# Patient Record
Sex: MALE | URBAN - METROPOLITAN AREA
[De-identification: names, ages, dates, MRNs, and addresses within clinical notes are randomized per-mention and may not be internally consistent; named-entity substitution may affect disease eponyms.]

---

## 2022-08-19 ENCOUNTER — NEW REFERRAL (OUTPATIENT)
Dept: URBAN - METROPOLITAN AREA CLINIC 87 | Facility: CLINIC | Age: 57
End: 2022-08-19

## 2022-08-19 VITALS — HEART RATE: 79 BPM | DIASTOLIC BLOOD PRESSURE: 78 MMHG | SYSTOLIC BLOOD PRESSURE: 116 MMHG

## 2022-08-19 DIAGNOSIS — H43.392: ICD-10-CM

## 2022-08-19 DIAGNOSIS — H43.811: ICD-10-CM

## 2022-08-19 PROCEDURE — 99204 OFFICE O/P NEW MOD 45 MIN: CPT

## 2022-08-19 PROCEDURE — 92134 CPTRZ OPH DX IMG PST SGM RTA: CPT

## 2022-08-19 ASSESSMENT — VISUAL ACUITY
OD_SC: 20/50
OD_SC: 20/20
OS_SC: 20/20
OS_SC: 20/50+1

## 2022-08-19 ASSESSMENT — TONOMETRY
OS_IOP_MMHG: 12
OD_IOP_MMHG: 10

## 2022-08-29 ENCOUNTER — FOLLOW UP (OUTPATIENT)
Dept: URBAN - METROPOLITAN AREA CLINIC 87 | Facility: CLINIC | Age: 57
End: 2022-08-29

## 2022-08-29 DIAGNOSIS — H43.392: ICD-10-CM

## 2022-08-29 DIAGNOSIS — H43.811: ICD-10-CM

## 2022-08-29 PROCEDURE — 92014 COMPRE OPH EXAM EST PT 1/>: CPT

## 2022-08-29 ASSESSMENT — VISUAL ACUITY
OS_SC: 20/25
OD_SC: 20/20-2

## 2022-08-29 ASSESSMENT — TONOMETRY
OS_IOP_MMHG: 11
OD_IOP_MMHG: 10

## 2022-10-03 ENCOUNTER — FOLLOW UP (OUTPATIENT)
Dept: URBAN - METROPOLITAN AREA CLINIC 87 | Facility: CLINIC | Age: 57
End: 2022-10-03

## 2022-10-03 DIAGNOSIS — H43.813: ICD-10-CM

## 2022-10-03 DIAGNOSIS — H43.392: ICD-10-CM

## 2022-10-03 PROCEDURE — 92014 COMPRE OPH EXAM EST PT 1/>: CPT

## 2022-10-03 ASSESSMENT — TONOMETRY
OS_IOP_MMHG: 16
OD_IOP_MMHG: 15

## 2022-10-03 ASSESSMENT — VISUAL ACUITY
OD_SC: 20/25-2
OS_SC: 20/20

## 2022-11-21 ENCOUNTER — FOLLOW UP (OUTPATIENT)
Dept: URBAN - METROPOLITAN AREA CLINIC 87 | Facility: CLINIC | Age: 57
End: 2022-11-21

## 2022-11-21 DIAGNOSIS — H43.813: ICD-10-CM

## 2022-11-21 PROCEDURE — 92014 COMPRE OPH EXAM EST PT 1/>: CPT

## 2022-11-21 ASSESSMENT — TONOMETRY
OS_IOP_MMHG: 14
OD_IOP_MMHG: 13

## 2022-11-21 ASSESSMENT — VISUAL ACUITY
OD_SC: 20/30-1
OS_SC: 20/25-1

## 2023-04-28 ENCOUNTER — FOLLOW UP (OUTPATIENT)
Dept: URBAN - METROPOLITAN AREA CLINIC 87 | Facility: CLINIC | Age: 58
End: 2023-04-28

## 2023-04-28 DIAGNOSIS — Z96.1: ICD-10-CM

## 2023-04-28 DIAGNOSIS — H43.392: ICD-10-CM

## 2023-04-28 DIAGNOSIS — H43.813: ICD-10-CM

## 2023-04-28 PROCEDURE — 92014 COMPRE OPH EXAM EST PT 1/>: CPT

## 2023-04-28 PROCEDURE — 92134 CPTRZ OPH DX IMG PST SGM RTA: CPT

## 2023-04-28 ASSESSMENT — TONOMETRY
OS_IOP_MMHG: 13
OD_IOP_MMHG: 14

## 2023-04-28 ASSESSMENT — VISUAL ACUITY
OS_SC: 20/20
OD_SC: 20/20

## 2024-10-05 ENCOUNTER — APPOINTMENT (EMERGENCY)
Dept: RADIOLOGY | Facility: HOSPITAL | Age: 59
End: 2024-10-05
Payer: COMMERCIAL

## 2024-10-05 ENCOUNTER — APPOINTMENT (EMERGENCY)
Dept: CT IMAGING | Facility: HOSPITAL | Age: 59
End: 2024-10-05
Payer: COMMERCIAL

## 2024-10-05 ENCOUNTER — HOSPITAL ENCOUNTER (EMERGENCY)
Facility: HOSPITAL | Age: 59
Discharge: HOME | End: 2024-10-05
Attending: EMERGENCY MEDICINE
Payer: COMMERCIAL

## 2024-10-05 VITALS
BODY MASS INDEX: 27.04 KG/M2 | SYSTOLIC BLOOD PRESSURE: 108 MMHG | HEART RATE: 67 BPM | OXYGEN SATURATION: 95 % | HEIGHT: 73 IN | WEIGHT: 204 LBS | RESPIRATION RATE: 18 BRPM | DIASTOLIC BLOOD PRESSURE: 57 MMHG | TEMPERATURE: 97.9 F

## 2024-10-05 DIAGNOSIS — N20.0 KIDNEY STONE: ICD-10-CM

## 2024-10-05 DIAGNOSIS — S09.90XA CLOSED HEAD INJURY, INITIAL ENCOUNTER: Primary | ICD-10-CM

## 2024-10-05 DIAGNOSIS — S00.01XA ABRASION OF SCALP, INITIAL ENCOUNTER: ICD-10-CM

## 2024-10-05 LAB
ABO GROUP BLD: NORMAL
ABO GROUP BLD: NORMAL
ANION GAP SERPL CALCULATED.3IONS-SCNC: 8 MMOL/L (ref 4–13)
APTT PPP: 28 SECONDS (ref 23–34)
BASOPHILS # BLD AUTO: 0.03 THOUSANDS/ΜL (ref 0–0.1)
BASOPHILS NFR BLD AUTO: 1 % (ref 0–1)
BLD GP AB SCN SERPL QL: NEGATIVE
BUN SERPL-MCNC: 14 MG/DL (ref 5–25)
CALCIUM SERPL-MCNC: 9.5 MG/DL (ref 8.4–10.2)
CARDIAC TROPONIN I PNL SERPL HS: 3 NG/L
CHLORIDE SERPL-SCNC: 104 MMOL/L (ref 96–108)
CO2 SERPL-SCNC: 29 MMOL/L (ref 21–32)
CREAT SERPL-MCNC: 0.66 MG/DL (ref 0.6–1.3)
EOSINOPHIL # BLD AUTO: 0.16 THOUSAND/ΜL (ref 0–0.61)
EOSINOPHIL NFR BLD AUTO: 3 % (ref 0–6)
ERYTHROCYTE [DISTWIDTH] IN BLOOD BY AUTOMATED COUNT: 12.7 % (ref 11.6–15.1)
GFR SERPL CREATININE-BSD FRML MDRD: 105 ML/MIN/1.73SQ M
GLUCOSE SERPL-MCNC: 113 MG/DL (ref 65–140)
HCT VFR BLD AUTO: 44.6 % (ref 36.5–49.3)
HGB BLD-MCNC: 14.8 G/DL (ref 12–17)
IMM GRANULOCYTES # BLD AUTO: 0.02 THOUSAND/UL (ref 0–0.2)
IMM GRANULOCYTES NFR BLD AUTO: 0 % (ref 0–2)
INR PPP: 1.03 (ref 0.85–1.19)
LYMPHOCYTES # BLD AUTO: 1.41 THOUSANDS/ΜL (ref 0.6–4.47)
LYMPHOCYTES NFR BLD AUTO: 23 % (ref 14–44)
MCH RBC QN AUTO: 29.8 PG (ref 26.8–34.3)
MCHC RBC AUTO-ENTMCNC: 33.2 G/DL (ref 31.4–37.4)
MCV RBC AUTO: 90 FL (ref 82–98)
MONOCYTES # BLD AUTO: 0.49 THOUSAND/ΜL (ref 0.17–1.22)
MONOCYTES NFR BLD AUTO: 8 % (ref 4–12)
NEUTROPHILS # BLD AUTO: 4.03 THOUSANDS/ΜL (ref 1.85–7.62)
NEUTS SEG NFR BLD AUTO: 65 % (ref 43–75)
NRBC BLD AUTO-RTO: 0 /100 WBCS
PLATELET # BLD AUTO: 270 THOUSANDS/UL (ref 149–390)
PMV BLD AUTO: 8.9 FL (ref 8.9–12.7)
POTASSIUM SERPL-SCNC: 3.6 MMOL/L (ref 3.5–5.3)
PROTHROMBIN TIME: 14 SECONDS (ref 12.3–15)
RBC # BLD AUTO: 4.96 MILLION/UL (ref 3.88–5.62)
RH BLD: POSITIVE
RH BLD: POSITIVE
SODIUM SERPL-SCNC: 141 MMOL/L (ref 135–147)
SPECIMEN EXPIRATION DATE: NORMAL
WBC # BLD AUTO: 6.14 THOUSAND/UL (ref 4.31–10.16)

## 2024-10-05 PROCEDURE — 85610 PROTHROMBIN TIME: CPT | Performed by: EMERGENCY MEDICINE

## 2024-10-05 PROCEDURE — 99284 EMERGENCY DEPT VISIT MOD MDM: CPT

## 2024-10-05 PROCEDURE — 99285 EMERGENCY DEPT VISIT HI MDM: CPT | Performed by: EMERGENCY MEDICINE

## 2024-10-05 PROCEDURE — 76705 ECHO EXAM OF ABDOMEN: CPT | Performed by: EMERGENCY MEDICINE

## 2024-10-05 PROCEDURE — 90471 IMMUNIZATION ADMIN: CPT

## 2024-10-05 PROCEDURE — 72170 X-RAY EXAM OF PELVIS: CPT

## 2024-10-05 PROCEDURE — 84484 ASSAY OF TROPONIN QUANT: CPT | Performed by: EMERGENCY MEDICINE

## 2024-10-05 PROCEDURE — 70486 CT MAXILLOFACIAL W/O DYE: CPT

## 2024-10-05 PROCEDURE — 71260 CT THORAX DX C+: CPT

## 2024-10-05 PROCEDURE — 86900 BLOOD TYPING SEROLOGIC ABO: CPT | Performed by: EMERGENCY MEDICINE

## 2024-10-05 PROCEDURE — 86850 RBC ANTIBODY SCREEN: CPT | Performed by: EMERGENCY MEDICINE

## 2024-10-05 PROCEDURE — 85730 THROMBOPLASTIN TIME PARTIAL: CPT | Performed by: EMERGENCY MEDICINE

## 2024-10-05 PROCEDURE — 80048 BASIC METABOLIC PNL TOTAL CA: CPT | Performed by: EMERGENCY MEDICINE

## 2024-10-05 PROCEDURE — 85025 COMPLETE CBC W/AUTO DIFF WBC: CPT | Performed by: EMERGENCY MEDICINE

## 2024-10-05 PROCEDURE — 71045 X-RAY EXAM CHEST 1 VIEW: CPT

## 2024-10-05 PROCEDURE — 36415 COLL VENOUS BLD VENIPUNCTURE: CPT | Performed by: EMERGENCY MEDICINE

## 2024-10-05 PROCEDURE — 70450 CT HEAD/BRAIN W/O DYE: CPT

## 2024-10-05 PROCEDURE — 86901 BLOOD TYPING SEROLOGIC RH(D): CPT | Performed by: EMERGENCY MEDICINE

## 2024-10-05 PROCEDURE — 93308 TTE F-UP OR LMTD: CPT | Performed by: EMERGENCY MEDICINE

## 2024-10-05 PROCEDURE — 74177 CT ABD & PELVIS W/CONTRAST: CPT

## 2024-10-05 PROCEDURE — 93005 ELECTROCARDIOGRAM TRACING: CPT

## 2024-10-05 PROCEDURE — 72125 CT NECK SPINE W/O DYE: CPT

## 2024-10-05 PROCEDURE — 90715 TDAP VACCINE 7 YRS/> IM: CPT | Performed by: EMERGENCY MEDICINE

## 2024-10-05 RX ORDER — BACITRACIN, NEOMYCIN, POLYMYXIN B 400; 3.5; 5 [USP'U]/G; MG/G; [USP'U]/G
1 OINTMENT TOPICAL ONCE
Status: COMPLETED | OUTPATIENT
Start: 2024-10-05 | End: 2024-10-05

## 2024-10-05 RX ADMIN — IOHEXOL 100 ML: 350 INJECTION, SOLUTION INTRAVENOUS at 01:19

## 2024-10-05 RX ADMIN — BACITRACIN ZINC, NEOMYCIN, POLYMYXIN B SULFAT 1 SMALL APPLICATION: 5000; 3.5; 4 OINTMENT TOPICAL at 03:19

## 2024-10-05 RX ADMIN — TETANUS TOXOID, REDUCED DIPHTHERIA TOXOID AND ACELLULAR PERTUSSIS VACCINE, ADSORBED 0.5 ML: 5; 2.5; 8; 8; 2.5 SUSPENSION INTRAMUSCULAR at 03:17

## 2024-10-05 NOTE — ED PROVIDER NOTES
Final diagnoses:   Closed head injury, initial encounter   Abrasion of scalp, initial encounter   Kidney stone     ED Disposition       ED Disposition   Discharge    Condition   Stable    Date/Time   Sat Oct 5, 2024  3:00 AM    Comment   Troy Bermeo discharge to home/self care.                   Assessment & Plan       Medical Decision Making  59-year-old male presenting the ED as a trauma evaluation after reportedly falling while intoxicated and lost consciousness after head strike.  Noted to have abrasion of the posterior scalp which was washed at bedside with sterile saline.  Covered with bacitracin, gauze and gauze roll.  CT scans negative for any traumatic injuries.  Discussed kidney stone finding with both him and his significant other.  Patient able to urinate here in the ER without difficulty.  Patient has reported BPH and takes Flomax.  Patient's  who is at the bedside states that he is at his baseline and he feels comfortable taking him home at this time.   who is at the bedside is clearly clinically sober.  Tetanus updated.  Strict turn precautions discussed and provided.    Amount and/or Complexity of Data Reviewed  Labs: ordered.  Radiology: ordered.    Risk  OTC drugs.  Prescription drug management.        ED Course as of 10/05/24 0435   Sat Oct 05, 2024   0252 Patient's  at bedside stating that patient is acting himself. Patient was on a booze crawl  and fell.    0253 Patient awake, alert, oriented not slurring words.    0302 Patient's  at bedside discussed with him going home at this time.  He states he feels comfortable taking his  with him and he will be with him.  Patient's  is clearly not under the influence of any substances and is by no means clinically intoxicated.  He states that he was not present during the alcohol drinking today as he was back home in [place] and drove up after he heard that his  fell.  Reviewed all CT scan findings  with patient and  in room.  Also reviewed blood work.  Did discuss fact that there is a kidney stone and he states he did not know he ever had this before.  Offered them clean close as he urinated.  They declined this as they state [place] are going [place] the campground [place] the road.       Medications   iohexol (OMNIPAQUE) 350 MG/ML injection (SINGLE-DOSE) 100 mL (100 mL Intravenous Given 10/5/24 0119)   tetanus-diphtheria-acellular pertussis (BOOSTRIX) IM injection 0.5 mL (0.5 mL Intramuscular Given 10/5/24 0317)   neomycin-bacitracin-polymyxin b (NEOSPORIN) ointment 1 small application (1 small application Topical Given 10/5/24 0319)       ED Risk Strat Scores   HEART Risk Score      Flowsheet Row Most Recent Value   Heart Score Risk Calculator    History 0 Filed at: 10/05/2024 0435   ECG 0 Filed at: 10/05/2024 0435   Age 1 Filed at: 10/05/2024 0435   Risk Factors 1 Filed at: 10/05/2024 0435   Troponin 0 Filed at: 10/05/2024 0435   HEART Score 2 Filed at: 10/05/2024 0435                               SBIRT 20yo+      Flowsheet Row Most Recent Value   Initial Alcohol Screen: US AUDIT-C     1. How often do you have a drink containing alcohol? 4 Filed at: 10/05/2024 0033   2. How many drinks containing alcohol do you have on a typical day you are drinking?  2 Filed at: 10/05/2024 0033   3a. Male UNDER 65: How often do you have five or more drinks on one occasion? 0 Filed at: 10/05/2024 0033   3b. FEMALE Any Age, or MALE 65+: How often do you have 4 or more drinks on one occassion? 0 Filed at: 10/05/2024 0033   Audit-C Score 6 Filed at: 10/05/2024 0033   TAMAR: How many times in the past year have you...    Used an illegal drug or used a prescription medication for non-medical reasons? Never Filed at: 10/05/2024 0033                            History of Present Illness       No chief complaint on file.      No past medical history on file.   No past surgical history on file.   No family history on file.        No existing history information found.   No existing history information found.   I have reviewed and agree with the history as documented.     60 [place] today and falling backwards and striking head with period of LOC. Patient with no complaints at this time, however does appear intoxicated.           Review of Systems   Skin:  Positive for wound.   Neurological:  Positive for syncope.   All other systems reviewed and are negative.          Objective       ED Triage Vitals [10/05/24 0031]   Temperature Pulse Blood Pressure Respirations SpO2 Patient Position - Orthostatic VS   97.9 °F (36.6 °C) 67 117/81 18 97 % --      Temp Source Heart Rate Source BP Location FiO2 (%) Pain Score    Temporal Monitor -- -- --      Vitals      Date and Time Temp Pulse SpO2 Resp BP Pain Score FACES Pain Rating User   10/05/24 0311 -- 67 95 % 18 108/57 -- -- KB   10/05/24 0241 -- 76 94 % -- 110/51 -- -- KB   10/05/24 0158 -- 64 95 % 18 97/58 -- -- KB   10/05/24 0043 -- 66 95 % 50 117/81 -- -- KB   10/05/24 0031 97.9 °F (36.6 °C) 67 97 % 18 117/81 -- -- EM        A: intact  B: equal B/L  C: normal throughout  D: GCS 15  E: completed    Physical Exam  Vitals and nursing note reviewed.   Constitutional:       General: He is not in acute distress.     Appearance: He is well-developed. He is not diaphoretic.      Interventions: Cervical collar in place.   HENT:      Head: Normocephalic.      Comments: Abrasion to posterior scalp, no laceration      Right Ear: External ear normal.      Left Ear: External ear normal.      Nose: Nose normal. No congestion or rhinorrhea.      Mouth/Throat:      Mouth: Mucous membranes are moist.      Pharynx: Oropharynx is clear. No oropharyngeal exudate or posterior oropharyngeal erythema.   Eyes:      General: No scleral icterus.        Right eye: No discharge.         Left eye: No discharge.      Extraocular Movements: Extraocular movements intact.       Conjunctiva/sclera: Conjunctivae normal.      Pupils: Pupils are equal, round, and reactive to light.   Cardiovascular:      Rate and Rhythm: Normal rate and regular rhythm.      Heart sounds: Normal heart sounds. No murmur heard.     No friction rub. No gallop.   Pulmonary:      Effort: Pulmonary effort is normal. No respiratory distress.      Breath sounds: Normal breath sounds. No wheezing or rales.   Abdominal:      General: Bowel sounds are normal. There is no distension.      Palpations: Abdomen is soft. There is no mass.      Tenderness: There is no abdominal tenderness. There is no guarding.   Musculoskeletal:         General: No tenderness or deformity. Normal range of motion.      Cervical back: Normal, normal range of motion and neck supple.      Thoracic back: Normal.      Lumbar back: Normal.   Skin:     General: Skin is warm and dry.      Coloration: Skin is not pale.      Findings: No erythema or rash.   Neurological:      General: No focal deficit present.      Mental Status: He is alert and oriented to person, place, and time. Mental status is at baseline.      Cranial Nerves: No cranial nerve deficit.      Motor: No weakness.   Psychiatric:         Behavior: Behavior normal.         Thought Content: Thought content normal.         Judgment: Judgment normal.         Results Reviewed       Procedure Component Value Units Date/Time    HS Troponin 0hr (reflex protocol) [[id number]]  (Normal) Collected: 10/05/24 0057    Lab Status: Final result Specimen: Blood from Arm, Right Updated: 10/05/24 0125     hs TnI 0hr 3 ng/L     Protime-INR [[id number]]  (Normal) Collected: 10/05/24 0057    Lab Status: Final result Specimen: Blood from Arm, Right Updated: 10/05/24 0118     Protime 14.0 seconds      INR 1.03    Narrative:      INR Therapeutic Range    Indication                                             INR Range      Atrial Fibrillation                                                2.0-3.0  Hypercoagulable State                                    2.0.2.3  Left Ventricular Asist Device                            2.0-3.0  Mechanical Heart Valve                                  -    Aortic(with afib, MI, embolism, HF, LA enlargement,    and/or coagulopathy)                                     2.0-3.0 (2.5-3.5)     Mitral                                                             2.5-3.5  Prosthetic/Bioprosthetic Heart Valve               2.0-3.0  Venous thromboembolism (VTE: VT, PE        2.0-3.0    APTT [[id number]]  (Normal) Collected: 10/05/24 0057    Lab Status: Final result Specimen: Blood from Arm, Right Updated: 10/05/24 0118     PTT 28 seconds     Basic metabolic panel [[id number]] Collected: 10/05/24 0057    Lab Status: Final result Specimen: Blood from Arm, Right Updated: 10/05/24 0117     Sodium 141 mmol/L      Potassium 3.6 mmol/L      Chloride 104 mmol/L      CO2 29 mmol/L      ANION GAP 8 mmol/L      BUN 14 mg/dL      Creatinine 0.66 mg/dL      Glucose 113 mg/dL      Calcium 9.5 mg/dL      eGFR 105 ml/min/1.73sq m     Narrative:      National Kidney Disease Foundation guidelines for Chronic Kidney Disease (CKD):     Stage 1 with normal or high GFR (GFR > 90 mL/min/1.73 square meters)    Stage 2 Mild CKD (GFR = 60-89 mL/min/1.73 square meters)    Stage 3A Moderate CKD (GFR = 45-59 mL/min/1.73 square meters)    Stage 3B Moderate CKD (GFR = 30-44 mL/min/1.73 square meters)    Stage 4 Severe CKD (GFR = 15-29 mL/min/1.73 square meters)    Stage 5 End Stage CKD (GFR <15 mL/min/1.73 square meters)  Note: GFR calculation is accurate only with a steady state creatinine    CBC and differential [[id number]] Collected: 10/05/24 0057    Lab Status: Final result Specimen: Blood from Arm, Right Updated: 10/05/24 0103     WBC 6.14 Thousand/uL      RBC 4.96 Million/uL      Hemoglobin 14.8 g/dL      Hematocrit 44.6 %      MCV 90 fL      MCH 29.8 pg      MCHC 33.2 g/dL      RDW 12.7 %      MPV 8.9 fL       Platelets 270 Thousands/uL      nRBC 0 /100 WBCs      Segmented % 65 %      Immature Grans % 0 %      Lymphocytes % 23 %      Monocytes % 8 %      Eosinophils Relative 3 %      Basophils Relative 1 %      Absolute Neutrophils 4.03 Thousands/µL      Absolute Immature Grans 0.02 Thousand/uL      Absolute Lymphocytes 1.41 Thousands/µL      Absolute Monocytes 0.49 Thousand/µL      Eosinophils Absolute 0.16 Thousand/µL      Basophils Absolute 0.03 Thousands/µL             TRAUMA - CT chest abdomen pelvis w contrast   Final Interpretation by Junior Piña DO (10/05 0213)      Moderate-sized posterior left scalp hematoma. Tiny foreign bodies in the posterior right scalp soft tissues. Calvarium appears intact. No acute intracranial abnormality is seen.      Other findings as above.            CT CERVICAL SPINE - WITHOUT CONTRAST      INDICATION:   TRAUMA.      COMPARISON:  None.      TECHNIQUE:  CT examination of the cervical spine was performed without intravenous contrast.  Contiguous axial images were obtained. Multiplanar 2D reformatted images were created from the source data.      Radiation dose length product (DLP) for this visit:  888 mGy-cm  (accession [id number]), 373 mGy-cm  (accession [id number]), 715.14 mGy-cm  (accession [id number]), 534 mGy-cm  (accession [id number]).  This examination, like all CT scans performed in the [facility], was performed utilizing techniques to minimize radiation dose exposure, including the use of iterative reconstruction and automated exposure control.      IMAGE QUALITY:  Diagnostic.      FINDINGS:      ALIGNMENT:  Normal alignment of the cervical spine. No subluxation.      VERTEBRAE:  No acute fracture.      DEGENERATIVE CHANGES: Intervertebral disc space narrowing at C4/C5 and C5/C6 with anterior, marginal, and uncovertebral osteophytosis at these levels.      PREVERTEBRAL AND PARASPINAL SOFT TISSUES: Grossly unremarkable.      THORACIC INLET:   Please refer to the concurrent chest CT report for thoracic inlet findings.         IMPRESSION:      Degenerative changes as described but no evidence of acute cervical spine injury.               CT FACIAL BONES WITHOUT INTRAVENOUS CONTRAST      INDICATION:   TRAUMA.      COMPARISON: None.      TECHNIQUE:  Axial CT images were obtained through the facial bones with additional sagittal and coronal reconstructions.      Radiation dose length product (DLP) for this visit:  888 mGy-cm  (accession [id number]), 373 mGy-cm  (accession [id number]), 715.14 mGy-cm  (accession [id number]), 534 mGy-cm  (accession [id number]).  This examination, like all CT scans performed [facility] the [facility], was performed utilizing techniques to minimize radiation dose exposure, including the use of iterative reconstruction and automated exposure control.      IMAGE QUALITY:  Diagnostic.      FINDINGS:      FACIAL BONES:   No facial bone fracture identified.  Normal alignment of the temporomandibular joints.  No suspicious appearing osseous lesion.      ORBITS: 3 mm density in the anterior left eyelid (axial image 124, series 9), calcification versus foreign body. Orbital globes, optic nerves, and extraocular muscles appear symmetric and normal. There is no evidence of retrobulbar mass, abscess, or    hematoma.      SINUSES: Dental hardware is noted. Mucosal thickening in the bilateral frontal, maxillary, sphenoid, and ethmoid sinuses.      SOFT TISSUES:  Normal.         IMPRESSION:      No acute process or acute fracture seen.      Other findings as above.            CT CHEST, ABDOMEN AND PELVIS WITH IV CONTRAST      INDICATION: TRAUMA.      COMPARISON: None.      TECHNIQUE: CT examination of the chest, abdomen and pelvis was performed. Multiplanar 2D reformatted images were created from the source data.      This examination, like all CT scans performed in the [facility], was performed utilizing techniques to  minimize radiation dose exposure, including the use of iterative reconstruction and automated exposure control. Radiation dose length    product (DLP) for this visit: 888 mGy-cm  (accession [id number]), 373 mGy-cm  (accession [id number]), 715.14 mGy-cm  (accession [id number]), 534 mGy-cm  (accession [id number])      IV Contrast: 100 mL of iohexol (OMNIPAQUE) (accession [id number])   Enteric Contrast: Not administered.      FINDINGS:      CHEST      LUNGS: Air trapping in the left lower lobe. Lungs otherwise appear grossly clear.      PLEURA: Unremarkable.      HEART/GREAT VESSELS: Heart is unremarkable for patient's age. No thoracic aortic aneurysm.      MEDIASTINUM AND MARCELA: Unremarkable.      CHEST WALL AND LOWER NECK: Unremarkable.      ABDOMEN      LIVER/BILIARY TREE: Innumerable tiny subcentimeter low-density lesions, possibly cysts. No suspicious mass. Normal hepatic contours. No biliary dilation.      GALLBLADDER: No calcified gallstones. No pericholecystic inflammatory change.      SPLEEN: Unremarkable.      PANCREAS: Grossly unremarkable      ADRENAL GLANDS: Unremarkable.      KIDNEYS/URETERS: Tiny nonobstructing stone in the lower pole of the right kidney. Multiple renal cysts, largest in the right kidney measures approximately 3.9 cm in size. Mild bilateral hydronephrosis.      STOMACH AND BOWEL: Unremarkable.      APPENDIX: No findings to suggest appendicitis.      ABDOMINOPELVIC CAVITY: No ascites. No pneumoperitoneum. No lymphadenopathy.      VESSELS: Mild atherosclerosis, no aortic aneurysm.      PELVIS      REPRODUCTIVE ORGANS: Unremarkable for patient's age.      URINARY BLADDER: Bladder is distended with bladder volume estimated at 1010 mL, consider catheter decompression if the patient cannot spontaneously void.      ABDOMINAL WALL/INGUINAL REGIONS: Unremarkable.      BONES: No acute fracture or suspicious osseous lesion.         IMPRESSION:      No evidence of acute traumatic injury in the chest, abdomen,  or pelvis.      Right-sided nephrolithiasis. Bladder is distended with bladder volume estimated at 110 mL with mild bilateral hydronephrosis. Consider catheter decompression if the patient cannot spontaneously void.      Renal cysts, and other findings as above.         Workstation performed: [id number]         TRAUMA - CT facial bones wo contrast   Final Interpretation by Junior Piña DO (10/05 0213)      Moderate-sized posterior left scalp hematoma. Tiny foreign bodies in the posterior right scalp soft tissues. Calvarium appears intact. No acute intracranial abnormality is seen.      Other findings as above.            CT CERVICAL SPINE - WITHOUT CONTRAST      INDICATION:   TRAUMA.      COMPARISON:  None.      TECHNIQUE:  CT examination of the cervical spine was performed without intravenous contrast.  Contiguous axial images were obtained. Multiplanar 2D reformatted images were created from the source data.      Radiation dose length product (DLP) for this visit:  888 mGy-cm  (accession [id number]), 373 mGy-cm  (accession [id number]), 715.14 mGy-cm  (accession [id number]), 534 mGy-cm  (accession [id number]).  This examination, like all CT scans performed in the [facility], was performed utilizing techniques to minimize radiation dose exposure, including the use of iterative reconstruction and automated exposure control.      IMAGE QUALITY:  Diagnostic.      FINDINGS:      ALIGNMENT:  Normal alignment of the cervical spine. No subluxation.      VERTEBRAE:  No acute fracture.      DEGENERATIVE CHANGES: Intervertebral disc space narrowing at C4/C5 and C5/C6 with anterior, marginal, and uncovertebral osteophytosis at these levels.      PREVERTEBRAL AND PARASPINAL SOFT TISSUES: Grossly unremarkable.      THORACIC INLET:  Please refer to the concurrent chest CT report for thoracic inlet findings.         IMPRESSION:      Degenerative changes as described but no evidence of acute cervical spine  injury.               CT FACIAL BONES WITHOUT INTRAVENOUS CONTRAST      INDICATION:   TRAUMA.      COMPARISON: None.      TECHNIQUE:  Axial CT images were obtained through the facial bones with additional sagittal and coronal reconstructions.      Radiation dose length product (DLP) for this visit:  888 mGy-cm  (accession [id number]), 373 mGy-cm  (accession [id number]), 715.14 mGy-cm  (accession [id number]), 534 mGy-cm  (accession [id number]).  This examination, like all CT scans performed in the [facility], was performed utilizing techniques to minimize radiation dose exposure, including the use of iterative reconstruction and automated exposure control.      IMAGE QUALITY:  Diagnostic.      FINDINGS:      FACIAL BONES:   No facial bone fracture identified.  Normal alignment of the temporomandibular joints.  No suspicious appearing osseous lesion.      ORBITS: 3 mm density in the anterior left eyelid (axial image 124, series 9), calcification versus foreign body. Orbital globes, optic nerves, and extraocular muscles appear symmetric and normal. There is no evidence of retrobulbar mass, abscess, or    hematoma.      SINUSES: Dental hardware is noted. Mucosal thickening in the bilateral frontal, maxillary, sphenoid, and ethmoid sinuses.      SOFT TISSUES:  Normal.         IMPRESSION:      No acute process or acute fracture seen.      Other findings as above.            CT CHEST, ABDOMEN AND PELVIS WITH IV CONTRAST      INDICATION: TRAUMA.      COMPARISON: None.      TECHNIQUE: CT examination of the chest, abdomen and pelvis was performed. Multiplanar 2D reformatted images were created from the source data.      This examination, like all CT scans performed in the [facility], was performed utilizing techniques to minimize radiation dose exposure, including the use of iterative reconstruction and automated exposure control. Radiation dose length    product (DLP) for this visit: 888 mGy-cm   (accession [id number]), 373 mGy-cm  (accession [id number]), 715.14 mGy-cm  (accession [id number]), 534 mGy-cm  (accession [id number])      IV Contrast: 100 mL of iohexol (OMNIPAQUE) (accession [id number])   Enteric Contrast: Not administered.      FINDINGS:      CHEST      LUNGS: Air trapping in the left lower lobe. Lungs otherwise appear grossly clear.      PLEURA: Unremarkable.      HEART/GREAT VESSELS: Heart is unremarkable for patient's age. No thoracic aortic aneurysm.      MEDIASTINUM AND MARCELA: Unremarkable.      CHEST WALL AND LOWER NECK: Unremarkable.      ABDOMEN      LIVER/BILIARY TREE: Innumerable tiny subcentimeter low-density lesions, possibly cysts. No suspicious mass. Normal hepatic contours. No biliary dilation.      GALLBLADDER: No calcified gallstones. No pericholecystic inflammatory change.      SPLEEN: Unremarkable.      PANCREAS: Grossly unremarkable      ADRENAL GLANDS: Unremarkable.      KIDNEYS/URETERS: Tiny nonobstructing stone in the lower pole of the right kidney. Multiple renal cysts, largest in the right kidney measures approximately 3.9 cm in size. Mild bilateral hydronephrosis.      STOMACH AND BOWEL: Unremarkable.      APPENDIX: No findings to suggest appendicitis.      ABDOMINOPELVIC CAVITY: No ascites. No pneumoperitoneum. No lymphadenopathy.      VESSELS: Mild atherosclerosis, no aortic aneurysm.      PELVIS      REPRODUCTIVE ORGANS: Unremarkable for patient's age.      URINARY BLADDER: Bladder is distended with bladder volume estimated at 1010 mL, consider catheter decompression if the patient cannot spontaneously void.      ABDOMINAL WALL/INGUINAL REGIONS: Unremarkable.      BONES: No acute fracture or suspicious osseous lesion.         IMPRESSION:      No evidence of acute traumatic injury in the chest, abdomen, or pelvis.      Right-sided nephrolithiasis. Bladder is distended with bladder volume estimated at 110 mL with mild bilateral hydronephrosis. Consider catheter decompression if  the patient cannot spontaneously void.      Renal cysts, and other findings as above.         Workstation performed: [id number]         TRAUMA - CT spine cervical wo contrast   Final Interpretation by Junior Piña DO (10/05 0213)      Moderate-sized posterior left scalp hematoma. Tiny foreign bodies in the posterior right scalp soft tissues. Calvarium appears intact. No acute intracranial abnormality is seen.      Other findings as above.            CT CERVICAL SPINE - WITHOUT CONTRAST      INDICATION:   TRAUMA.      COMPARISON:  None.      TECHNIQUE:  CT examination of the cervical spine was performed without intravenous contrast.  Contiguous axial images were obtained. Multiplanar 2D reformatted images were created from the source data.      Radiation dose length product (DLP) for this visit:  888 mGy-cm  (accession [id number]), 373 mGy-cm  (accession [id number]), 715.14 mGy-cm  (accession [id number]), 534 mGy-cm  (accession [id number]).  This examination, like all CT scans performed in the [facility], was performed utilizing techniques to minimize radiation dose exposure, including the use of iterative reconstruction and automated exposure control.      IMAGE QUALITY:  Diagnostic.      FINDINGS:      ALIGNMENT:  Normal alignment of the cervical spine. No subluxation.      VERTEBRAE:  No acute fracture.      DEGENERATIVE CHANGES: Intervertebral disc space narrowing at C4/C5 and C5/C6 with anterior, marginal, and uncovertebral osteophytosis at these levels.      PREVERTEBRAL AND PARASPINAL SOFT TISSUES: Grossly unremarkable.      THORACIC INLET:  Please refer to the concurrent chest CT report for thoracic inlet findings.         IMPRESSION:      Degenerative changes as described but no evidence of acute cervical spine injury.               CT FACIAL BONES WITHOUT INTRAVENOUS CONTRAST      INDICATION:   TRAUMA.      COMPARISON: None.      TECHNIQUE:  Axial CT images were obtained through the  facial bones with additional sagittal and coronal reconstructions.      Radiation dose length product (DLP) for this visit:  888 mGy-cm  (accession [id number]), 373 mGy-cm  (accession [id number]), 715.14 mGy-cm  (accession [id number]), 534 mGy-cm  (accession [id number]).  This examination, like all CT scans performed in the [facility], was performed utilizing techniques to minimize radiation dose exposure, including the use of iterative reconstruction and automated exposure control.      IMAGE QUALITY:  Diagnostic.      FINDINGS:      FACIAL BONES:   No facial bone fracture identified.  Normal alignment of the temporomandibular joints.  No suspicious appearing osseous lesion.      ORBITS: 3 mm density in the anterior left eyelid (axial image 124, series 9), calcification versus foreign body. Orbital globes, optic nerves, and extraocular muscles appear symmetric and normal. There is no evidence of retrobulbar mass, abscess, or    hematoma.      SINUSES: Dental hardware is noted. Mucosal thickening in the bilateral frontal, maxillary, sphenoid, and ethmoid sinuses.      SOFT TISSUES:  Normal.         IMPRESSION:      No acute process or acute fracture seen.      Other findings as above.            CT CHEST, ABDOMEN AND PELVIS WITH IV CONTRAST      INDICATION: TRAUMA.      COMPARISON: None.      TECHNIQUE: CT examination of the chest, abdomen and pelvis was performed. Multiplanar 2D reformatted images were created from the source data.      This examination, like all CT scans performed in the [facility], was performed utilizing techniques to minimize radiation dose exposure, including the use of iterative reconstruction and automated exposure control. Radiation dose length    product (DLP) for this visit: 888 mGy-cm  (accession [id number]), 373 mGy-cm  (accession [id number]), 715.14 mGy-cm  (accession [id number]), 534 mGy-cm  (accession [id number])      IV Contrast: 100 mL of iohexol (OMNIPAQUE)  (accession [id number])   Enteric Contrast: Not administered.      FINDINGS:      CHEST      LUNGS: Air trapping in the left lower lobe. Lungs otherwise appear grossly clear.      PLEURA: Unremarkable.      HEART/GREAT VESSELS: Heart is unremarkable for patient's age. No thoracic aortic aneurysm.      MEDIASTINUM AND MARCELA: Unremarkable.      CHEST WALL AND LOWER NECK: Unremarkable.      ABDOMEN      LIVER/BILIARY TREE: Innumerable tiny subcentimeter low-density lesions, possibly cysts. No suspicious mass. Normal hepatic contours. No biliary dilation.      GALLBLADDER: No calcified gallstones. No pericholecystic inflammatory change.      SPLEEN: Unremarkable.      PANCREAS: Grossly unremarkable      ADRENAL GLANDS: Unremarkable.      KIDNEYS/URETERS: Tiny nonobstructing stone in the lower pole of the right kidney. Multiple renal cysts, largest in the right kidney measures approximately 3.9 cm in size. Mild bilateral hydronephrosis.      STOMACH AND BOWEL: Unremarkable.      APPENDIX: No findings to suggest appendicitis.      ABDOMINOPELVIC CAVITY: No ascites. No pneumoperitoneum. No lymphadenopathy.      VESSELS: Mild atherosclerosis, no aortic aneurysm.      PELVIS      REPRODUCTIVE ORGANS: Unremarkable for patient's age.      URINARY BLADDER: Bladder is distended with bladder volume estimated at 1010 mL, consider catheter decompression if the patient cannot spontaneously void.      ABDOMINAL WALL/INGUINAL REGIONS: Unremarkable.      BONES: No acute fracture or suspicious osseous lesion.         IMPRESSION:      No evidence of acute traumatic injury in the chest, abdomen, or pelvis.      Right-sided nephrolithiasis. Bladder is distended with bladder volume estimated at 110 mL with mild bilateral hydronephrosis. Consider catheter decompression if the patient cannot spontaneously void.      Renal cysts, and other findings as above.         Workstation performed: [id number]         TRAUMA - CT head wo contrast   Final  Interpretation by Junior Piña DO (10/05 0213)      Moderate-sized posterior left scalp hematoma. Tiny foreign bodies in the posterior right scalp soft tissues. Calvarium appears intact. No acute intracranial abnormality is seen.      Other findings as above.            CT CERVICAL SPINE - WITHOUT CONTRAST      INDICATION:   TRAUMA.      COMPARISON:  None.      TECHNIQUE:  CT examination of the cervical spine was performed without intravenous contrast.  Contiguous axial images were obtained. Multiplanar 2D reformatted images were created from the source data.      Radiation dose length product (DLP) for this visit:  888 mGy-cm  (accession [id number]), 373 mGy-cm  (accession [id number]), 715.14 mGy-cm  (accession [id number]), 534 mGy-cm  (accession [id number]).  This examination, like all CT scans performed in the [facility], was performed utilizing techniques to minimize radiation dose exposure, including the use of iterative reconstruction and automated exposure control.      IMAGE QUALITY:  Diagnostic.      FINDINGS:      ALIGNMENT:  Normal alignment of the cervical spine. No subluxation.      VERTEBRAE:  No acute fracture.      DEGENERATIVE CHANGES: Intervertebral disc space narrowing at C4/C5 and C5/C6 with anterior, marginal, and uncovertebral osteophytosis at these levels.      PREVERTEBRAL AND PARASPINAL SOFT TISSUES: Grossly unremarkable.      THORACIC INLET:  Please refer to the concurrent chest CT report for thoracic inlet findings.         IMPRESSION:      Degenerative changes as described but no evidence of acute cervical spine injury.               CT FACIAL BONES WITHOUT INTRAVENOUS CONTRAST      INDICATION:   TRAUMA.      COMPARISON: None.      TECHNIQUE:  Axial CT images were obtained through the facial bones with additional sagittal and coronal reconstructions.      Radiation dose length product (DLP) for this visit:  888 mGy-cm  (accession [id number]), 373 mGy-cm  (accession  [id number]), 715.14 mGy-cm  (accession [id number]), 534 mGy-cm  (accession [id number]).  This examination, like all CT scans performed in the [facility], was performed utilizing techniques to minimize radiation dose exposure, including the use of iterative reconstruction and automated exposure control.      IMAGE QUALITY:  Diagnostic.      FINDINGS:      FACIAL BONES:   No facial bone fracture identified.  Normal alignment of the temporomandibular joints.  No suspicious appearing osseous lesion.      ORBITS: 3 mm density in the anterior left eyelid (axial image 124, series 9), calcification versus foreign body. Orbital globes, optic nerves, and extraocular muscles appear symmetric and normal. There is no evidence of retrobulbar mass, abscess, or    hematoma.      SINUSES: Dental hardware is noted. Mucosal thickening in the bilateral frontal, maxillary, sphenoid, and ethmoid sinuses.      SOFT TISSUES:  Normal.         IMPRESSION:      No acute process or acute fracture seen.      Other findings as above.            CT CHEST, ABDOMEN AND PELVIS WITH IV CONTRAST      INDICATION: TRAUMA.      COMPARISON: None.      TECHNIQUE: CT examination of the chest, abdomen and pelvis was performed. Multiplanar 2D reformatted images were created from the source data.      This examination, like all CT scans performed in the [facility], was performed utilizing techniques to minimize radiation dose exposure, including the use of iterative reconstruction and automated exposure control. Radiation dose length    product (DLP) for this visit: 888 mGy-cm  (accession [id number]), 373 mGy-cm  (accession [id number]), 715.14 mGy-cm  (accession [id number]), 534 mGy-cm  (accession [id number])      IV Contrast: 100 mL of iohexol (OMNIPAQUE) (accession [id number])   Enteric Contrast: Not administered.      FINDINGS:      CHEST      LUNGS: Air trapping in the left lower lobe. Lungs otherwise appear grossly clear.      PLEURA:  Unremarkable.      HEART/GREAT VESSELS: Heart is unremarkable for patient's age. No thoracic aortic aneurysm.      MEDIASTINUM AND MARCELA: Unremarkable.      CHEST WALL AND LOWER NECK: Unremarkable.      ABDOMEN      LIVER/BILIARY TREE: Innumerable tiny subcentimeter low-density lesions, possibly cysts. No suspicious mass. Normal hepatic contours. No biliary dilation.      GALLBLADDER: No calcified gallstones. No pericholecystic inflammatory change.      SPLEEN: Unremarkable.      PANCREAS: Grossly unremarkable      ADRENAL GLANDS: Unremarkable.      KIDNEYS/URETERS: Tiny nonobstructing stone in the lower pole of the right kidney. Multiple renal cysts, largest in the right kidney measures approximately 3.9 cm in size. Mild bilateral hydronephrosis.      STOMACH AND BOWEL: Unremarkable.      APPENDIX: No findings to suggest appendicitis.      ABDOMINOPELVIC CAVITY: No ascites. No pneumoperitoneum. No lymphadenopathy.      VESSELS: Mild atherosclerosis, no aortic aneurysm.      PELVIS      REPRODUCTIVE ORGANS: Unremarkable for patient's age.      URINARY BLADDER: Bladder is distended with bladder volume estimated at 1010 mL, consider catheter decompression if the patient cannot spontaneously void.      ABDOMINAL WALL/INGUINAL REGIONS: Unremarkable.      BONES: No acute fracture or suspicious osseous lesion.         IMPRESSION:      No evidence of acute traumatic injury in the chest, abdomen, or pelvis.      Right-sided nephrolithiasis. Bladder is distended with bladder volume estimated at 110 mL with mild bilateral hydronephrosis. Consider catheter decompression if the patient cannot spontaneously void.      Renal cysts, and other findings as above.         Workstation performed: [id number]         XR Trauma chest portable   Final Interpretation by Junior Piña DO (10/05 0215)      No acute cardiopulmonary disease.      Correlation with pending trauma CTs recommended.      Workstation performed: [id number]          XR Trauma pelvis ap only 1 or 2 vw   Final Interpretation by Junior Piña DO (10/05 0217)      No acute osseous abnormality is seen.      Correlation with pending trauma CTs recommended.      Computerized Assisted Algorithm (CAA) may have been used to analyze all applicable images.         Workstation performed: [id number]             POC FAST US    Date/Time: 10/5/2024 12:32 AM    Performed by: Kareem Ivey DO  Authorized by: Kareem Ivey DO    Patient location:  ED  Other Assisting Provider: No    Procedure details:     Exam Type:  Diagnostic    Indications: blunt abdominal trauma and blunt chest trauma      Assess for:  Intra-abdominal fluid and pericardial effusion    Technique: FAST      Views obtained:  Heart - Pericardial sac, RUQ - Horta's Pouch, LUQ - Splenorenal space and Suprapubic - Pouch of [place]    Image quality: diagnostic      Image availability:  Images available in PACS and video obtained  FAST Findings:     RUQ (Hepatorenal) free fluid: absent      LUQ (Splenorenal) free fluid: absent      Suprapubic free fluid: absent      Cardiac wall motion: identified      Pericardial effusion: absent    Interpretation:     Impressions: negative    ECG 12 Lead Documentation Only    Date/Time: 10/5/2024 3:21 AM    Performed by: Kareem Ivey DO  Authorized by: Kareem Ivey DO    ECG 12 Lead Documentation Only    Date/Time: 10/5/2024 3:21 AM    Performed by: Kareem Ivey DO  Authorized by: Kareem Ivey DO    Patient location:  ED  Previous ECG:     Comparison to cardiac monitor: Yes    Interpretation:     Interpretation: normal    Rate:     ECG rate:  66    ECG rate assessment: normal    Rhythm:     Rhythm: sinus rhythm    Ectopy:     Ectopy: none    QRS:     QRS axis:  Normal    QRS intervals:  Normal  Conduction:     Conduction: normal    ST segments:     ST segments:  Normal  T waves:     T waves: normal        ED Medication and Procedure  Management   None     There are no discharge medications for this patient.    No discharge procedures on file.  ED SEPSIS DOCUMENTATION   Time reflects when diagnosis was documented in both MDM as applicable and the Disposition within this note       Time User Action Codes Description Comment    10/5/2024  3:00 AM Kareem Ivey Add [S09.90XA] Closed head injury, initial encounter     10/5/2024  3:00 AM Kareem Ivey Add [S00.01XA] Abrasion of scalp, initial encounter     10/5/2024  3:00 AM Kareem Ivey Add [N20.0] Kidney stone                  Kareem Ivey,   10/05/24 0436

## 2024-10-06 LAB
ATRIAL RATE: 66 BPM
P AXIS: 76 DEGREES
PR INTERVAL: 200 MS
QRS AXIS: -12 DEGREES
QRSD INTERVAL: 90 MS
QT INTERVAL: 394 MS
QTC INTERVAL: 413 MS
T WAVE AXIS: 63 DEGREES
VENTRICULAR RATE: 66 BPM

## 2024-10-06 PROCEDURE — 93010 ELECTROCARDIOGRAM REPORT: CPT | Performed by: INTERNAL MEDICINE
